# Patient Record
Sex: FEMALE | Race: BLACK OR AFRICAN AMERICAN
[De-identification: names, ages, dates, MRNs, and addresses within clinical notes are randomized per-mention and may not be internally consistent; named-entity substitution may affect disease eponyms.]

---

## 2019-08-05 ENCOUNTER — HOSPITAL ENCOUNTER (EMERGENCY)
Dept: HOSPITAL 25 - UCCORT | Age: 34
Discharge: HOME | End: 2019-08-05
Payer: COMMERCIAL

## 2019-08-05 VITALS — DIASTOLIC BLOOD PRESSURE: 71 MMHG | SYSTOLIC BLOOD PRESSURE: 115 MMHG

## 2019-08-05 DIAGNOSIS — N39.0: Primary | ICD-10-CM

## 2019-08-05 PROCEDURE — 99202 OFFICE O/P NEW SF 15 MIN: CPT

## 2019-08-05 PROCEDURE — 87186 SC STD MICRODIL/AGAR DIL: CPT

## 2019-08-05 PROCEDURE — 81003 URINALYSIS AUTO W/O SCOPE: CPT

## 2019-08-05 PROCEDURE — G0463 HOSPITAL OUTPT CLINIC VISIT: HCPCS

## 2019-08-05 PROCEDURE — 87086 URINE CULTURE/COLONY COUNT: CPT

## 2019-08-05 PROCEDURE — 87077 CULTURE AEROBIC IDENTIFY: CPT

## 2019-08-05 NOTE — UC
Complaint Female HPI





- HPI Summary


HPI Summary: 





34 yo female presents with urinary frequency and cloudy urine intermittently 

for the last 2 weeks. She saw her PCP and was told there was no infection, but 

she continues to have symptoms. Today her symptoms became more intense. She has 

been drinking cranberry juice with no relief. Denies fever, chills, abdominal 

pain, n/v, flank pain 





- History Of Current Complaint


Chief Complaint: UCGU


Stated Complaint: URINARY COMPLAINT


Time Seen by Provider: 08/05/19 11:52


Hx Obtained From: Patient


Hx Last Menstrual Period: pt has IUD - 7/14/19


Onset/Duration: Gradual Onset


Severity Initially: Mild


Severity Currently: Mild


Pain Intensity: 3


Pain Scale Used: 0-10 Numeric





- Allergies/Home Medications


Allergies/Adverse Reactions: 


 Allergies











Allergy/AdvReac Type Severity Reaction Status Date / Time


 


acetaminophen [From Vicodin] Allergy  Nausea And Verified 08/05/19 11:17





   Vomiting  


 


hydrocodone [From Vicodin] Allergy  Nausea And Verified 08/05/19 11:17





   Vomiting  











Home Medications: 


 Home Medications





Glycopyrrolate 1 tab PO DAILY 08/05/19 [History Confirmed 08/05/19]


Lysine HCl [l-Lysine] 1 tab PO DAILY 08/05/19 [History Confirmed 08/05/19]











PMH/Surg Hx/FS Hx/Imm Hx





- Additional Past Medical History


Additional PMH: 





None





- Surgical History


Surgical History: None





- Family History


Known Family History: Positive: Non-Contributory





- Social History


Occupation: Employed Full-time


Lives: With Family


Alcohol Use: Rare


Substance Use Type: None


Smoking Status (MU): Never Smoked Tobacco





Review of Systems


All Other Systems Reviewed And Are Negative: Yes


Constitutional: Positive: Negative


Skin: Positive: Negative


Respiratory: Positive: Negative


Cardiovascular: Positive: Negative


Genitourinary: Positive: Frequency, Other - odor urine


Neurological: Positive: Negative


Psychological: Positive: Negative





Physical Exam





- Summary


Physical Exam Summary: 





GENERAL: NAD. WDWN. No pain distress.


SKIN: No rashes, sores, lesions, or open wounds.


NECK: Supple. Nontender. No lymphadenopathy. 


CHEST:  CTAB. No r/r/w. No accessory muscle use. Breathing comfortably and in 

no distress.


CV:  RRR. Without m/r/g. Pulses intact. Cap refill <2seconds


ABDOMEN:  Soft. NTTP. No distention or guarding. No CVA tenderness. Bowel 

sounds present


NEURO: Alert. 


PSYCH: Age appropriate behavior.


Triage Information Reviewed: Yes


Vital Signs: 


 Initial Vital Signs











Temp  97.5 F   08/05/19 11:08


 


Pulse  74   08/05/19 11:08


 


Resp  16   08/05/19 11:08


 


BP  115/71   08/05/19 11:08


 


Pulse Ox  100   08/05/19 11:08








 Laboratory Tests











  08/05/19





  11:54


 


POC Urine Color  Yellow


 


POC Urine Clarity  Clear


 


POC Urine pH  8.5


 


POC Ur Specif Gravity  1.015


 


POC Urine Protein  Trace A


 


POC Ur Glucose (UA)  Negative


 


POC Urine Ketones  Negative


 


POC Urine Blood  3+ A


 


POC Urine Nitrite  Positive A


 


POC Urine Bilirubin  Negative


 


POC Urine Urobilinogen  0.2


 


POC U Leukocyte Esteras  3+ A











Vital Signs Reviewed: Yes





 Complaint Female Dx





- Course


Course Of Treatment: 





UA positive. Will treat with macrobid and send her urine for culture.





- Differential Dx/Diagnosis


Provider Diagnosis: 


 UTI (urinary tract infection)








Discharge





- Sign-Out/Discharge


Documenting (check all that apply): Patient Departure


All imaging exams completed and their final reports reviewed: No Studies





- Discharge Plan


Condition: Stable


Disposition: HOME


Prescriptions: 


Fluconazole 150 MG TAB* [Diflucan 150 MG TAB*] 150 mg PO ONCE #3 tablet


Nitrofurantoin Monohyd/M-Cryst [Macrobid 100 mg Capsule] 100 mg PO BID #10 cap


Patient Education Materials:  Urinary Tract Infection in Women (ED)


Referrals: 


Non Staff,Doctor [Primary Care Provider] - 


Additional Instructions: 


If you develop a fever, shortness of breath, chest pain, new or worsening 

symptoms - please call your PCP or go to the ED immediately.


 








- Billing Disposition and Condition


Condition: STABLE


Disposition: Home